# Patient Record
Sex: MALE | Race: BLACK OR AFRICAN AMERICAN | NOT HISPANIC OR LATINO
[De-identification: names, ages, dates, MRNs, and addresses within clinical notes are randomized per-mention and may not be internally consistent; named-entity substitution may affect disease eponyms.]

---

## 2022-03-22 PROBLEM — Z00.00 ENCOUNTER FOR PREVENTIVE HEALTH EXAMINATION: Status: ACTIVE | Noted: 2022-03-22

## 2022-05-05 ENCOUNTER — APPOINTMENT (OUTPATIENT)
Dept: ENDOCRINOLOGY | Facility: CLINIC | Age: 42
End: 2022-05-05

## 2022-07-07 ENCOUNTER — APPOINTMENT (OUTPATIENT)
Dept: ENDOCRINOLOGY | Facility: CLINIC | Age: 42
End: 2022-07-07

## 2022-09-21 ENCOUNTER — NON-APPOINTMENT (OUTPATIENT)
Age: 42
End: 2022-09-21

## 2022-09-22 ENCOUNTER — APPOINTMENT (OUTPATIENT)
Dept: UROLOGY | Facility: CLINIC | Age: 42
End: 2022-09-22

## 2022-10-06 ENCOUNTER — NON-APPOINTMENT (OUTPATIENT)
Age: 42
End: 2022-10-06

## 2022-10-07 ENCOUNTER — APPOINTMENT (OUTPATIENT)
Dept: UROLOGY | Facility: CLINIC | Age: 42
End: 2022-10-07

## 2022-11-03 ENCOUNTER — APPOINTMENT (OUTPATIENT)
Dept: ENDOCRINOLOGY | Facility: CLINIC | Age: 42
End: 2022-11-03

## 2022-11-15 ENCOUNTER — NON-APPOINTMENT (OUTPATIENT)
Age: 42
End: 2022-11-15

## 2022-11-18 ENCOUNTER — APPOINTMENT (OUTPATIENT)
Dept: UROLOGY | Facility: CLINIC | Age: 42
End: 2022-11-18

## 2022-12-06 ENCOUNTER — APPOINTMENT (OUTPATIENT)
Dept: ENDOCRINOLOGY | Facility: CLINIC | Age: 42
End: 2022-12-06

## 2022-12-06 VITALS
WEIGHT: 315 LBS | HEART RATE: 104 BPM | BODY MASS INDEX: 37.19 KG/M2 | HEIGHT: 77 IN | DIASTOLIC BLOOD PRESSURE: 126 MMHG | SYSTOLIC BLOOD PRESSURE: 193 MMHG

## 2022-12-06 DIAGNOSIS — I10 ESSENTIAL (PRIMARY) HYPERTENSION: ICD-10-CM

## 2022-12-06 DIAGNOSIS — E05.80 OTHER THYROTOXICOSIS W/OUT THYROTOXIC CRISIS OR STORM: ICD-10-CM

## 2022-12-06 DIAGNOSIS — E66.8 OTHER OBESITY: ICD-10-CM

## 2022-12-06 DIAGNOSIS — R73.03 PREDIABETES.: ICD-10-CM

## 2022-12-06 DIAGNOSIS — T46.2X5A OTHER THYROTOXICOSIS W/OUT THYROTOXIC CRISIS OR STORM: ICD-10-CM

## 2022-12-06 PROCEDURE — 76536 US EXAM OF HEAD AND NECK: CPT

## 2022-12-06 PROCEDURE — 99205 OFFICE O/P NEW HI 60 MIN: CPT | Mod: 25

## 2022-12-06 RX ORDER — ATENOLOL 25 MG/1
25 TABLET ORAL DAILY
Qty: 60 | Refills: 0 | Status: COMPLETED | COMMUNITY
Start: 2022-12-06 | End: 2022-12-06

## 2022-12-08 RX ORDER — METHIMAZOLE 10 MG/1
10 TABLET ORAL 3 TIMES DAILY
Qty: 270 | Refills: 3 | Status: ACTIVE | COMMUNITY
Start: 2022-06-14 | End: 1900-01-01

## 2022-12-09 RX ORDER — COLCHICINE 0.6 MG/1
TABLET ORAL
Refills: 0 | Status: ACTIVE | COMMUNITY

## 2022-12-09 NOTE — ASSESSMENT
[Long Term Vascular Complications] : long term vascular complications of diabetes [Carbohydrate Consistent Diet] : carbohydrate consistent diet [Importance of Diet and Exercise] : importance of diet and exercise to improve glycemic control, achieve weight loss and improve cardiovascular health [Weight Loss] : weight loss [Methimazole Therapy/PTU Therapy] : Risks and benefits of methimazole therapy/PTU therapy were discussed with the patient, including rash, liver dysfunction, and agranulocytosis. Patient was instructed to call the office for flu-like symptoms (e.g. 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 unclear if pt is presently on amiodarone. urgent Cards evaluation advised, he states he has an upcoming apt this week. Now appears hyperthyroid. WE discussed all options for reestablishing euthyroid state, including TT vs. ANTONY. vs. long term DIAZ use, and their respective r/b. After discussion, he is interested on definitive Rx w/ ANTONY. We will continue BB and increase DIAZ at this time to 30 mg, and perform NM uptake exam + treatment in 2-4 weeks. Verbalized understanding and agrees with treatment plan, will contact MD and seek emergency medical care if condition changes, we reviewed the sign/symptoms or worsening thyrotoxicosis and thyroid storm.\par \par 1) Obesity, Morbid: Class III, complicated by accelerated HTN. High risk of metabolic syndrome and future complications. Discussed options including meds, bariatric surgery and lifestyle modification. RB and alternatives discussed. Questions answered and he verbalized understanding. Refer to nutrition and start hypocaloric, hypocarb diet. further meds on hold pending restoration of euthyroid state\par \par HTN\par accelerated, c/b CHF per pt. r/o 2ry causes. low salt reviewed. urgent cards eval reinforced.\par \par

## 2022-12-09 NOTE — PROCEDURE
[FreeTextEntry1] : POC US of the thyroid gland 12/2022\par heterogenous isoechoic thyroid gland, thickened isthmus at 0.6 cm, Left Superior Thyroid Artery velocity at45 cm/s, moderately vascular thyroid parenchyma, no nodules.

## 2022-12-09 NOTE — HISTORY OF PRESENT ILLNESS
[FreeTextEntry1] : 41 y/o M w/ Hx of obesity, HTN, CHF w/ Afib and RVR, hyperthyroidism\par here for initial evaluation and management of several issues\par generally feels well and endorses no acute complaints.\par reports recent diagnosis of hyperthyroid state. reports two consecutive hosp at [facility] for palpitations. reports symptoms of excessive sweating, anxiety over the last 6 months. no clear trigger. Reports being briefly provided w./ amiodarone treatment, but denies present use. no past lithium/steroid exp. no family hx reported. no collateral info available for interpretation. recently, MTX 5 mg was started after latest hosp for rate control. reports adherence but is unable to describe meds, defers to consult form. he otherwise denies any f/c, CP, SOB, depressed mood, n/v, stool/urinary abn, skin/weight changes, heat/cold intolerance, HAs, breast/nipple changes, polyuria/polydipsia/nocturia or other complaints. he again denies any dysphagia, hoarseness, neck tenderness or new palpable masses. he again denies any family history of thyroid disorders or personal exposure to ionizing radiation.\par \par \par

## 2022-12-13 LAB
ALBUMIN SERPL ELPH-MCNC: 3.9 G/DL
ALDOSTERONE SERUM: 22.6 NG/DL
ALP BLD-CCNC: 83 U/L
ALT SERPL-CCNC: 35 U/L
ANION GAP SERPL CALC-SCNC: 13 MMOL/L
AST SERPL-CCNC: 29 U/L
BILIRUB SERPL-MCNC: 0.6 MG/DL
BUN SERPL-MCNC: 15 MG/DL
CALCIUM SERPL-MCNC: 9.8 MG/DL
CHLORIDE SERPL-SCNC: 104 MMOL/L
CO2 SERPL-SCNC: 27 MMOL/L
CREAT SERPL-MCNC: 1.45 MG/DL
EGFR: 61 ML/MIN/1.73M2
ESTIMATED AVERAGE GLUCOSE: 108 MG/DL
GLUCOSE SERPL-MCNC: 130 MG/DL
HBA1C MFR BLD HPLC: 5.4 %
POTASSIUM SERPL-SCNC: 4.3 MMOL/L
PROT SERPL-MCNC: 6.2 G/DL
RENIN ACTIVITY, PLASMA: 0.18 NG/ML/HR
SODIUM SERPL-SCNC: 143 MMOL/L
T3 SERPL-MCNC: 135 NG/DL
T4 FREE SERPL-MCNC: 2.6 NG/DL
THYROGLOB AB SERPL-ACNC: <20 IU/ML
THYROPEROXIDASE AB SERPL IA-ACNC: <10 IU/ML
TSH RECEPTOR AB: 32.4 IU/L
TSH SERPL-ACNC: <0.01 UIU/ML
TSI ACT/NOR SER: 37.5 IU/L

## 2023-01-03 ENCOUNTER — APPOINTMENT (OUTPATIENT)
Dept: UROLOGY | Facility: CLINIC | Age: 43
End: 2023-01-03

## 2023-07-13 ENCOUNTER — APPOINTMENT (OUTPATIENT)
Dept: ENDOCRINOLOGY | Facility: CLINIC | Age: 43
End: 2023-07-13

## 2023-09-07 ENCOUNTER — APPOINTMENT (OUTPATIENT)
Dept: PULMONOLOGY | Facility: CLINIC | Age: 43
End: 2023-09-07
Payer: COMMERCIAL

## 2023-09-07 VITALS
DIASTOLIC BLOOD PRESSURE: 90 MMHG | SYSTOLIC BLOOD PRESSURE: 120 MMHG | TEMPERATURE: 95 F | OXYGEN SATURATION: 98 % | BODY MASS INDEX: 37.19 KG/M2 | HEIGHT: 77 IN | WEIGHT: 315 LBS | HEART RATE: 98 BPM

## 2023-09-07 PROCEDURE — 99204 OFFICE O/P NEW MOD 45 MIN: CPT

## 2023-09-07 RX ORDER — ATORVASTATIN CALCIUM 80 MG/1
TABLET, FILM COATED ORAL
Refills: 0 | Status: ACTIVE | COMMUNITY

## 2023-09-07 RX ORDER — SPIRONOLACTONE 50 MG/1
TABLET ORAL
Refills: 0 | Status: ACTIVE | COMMUNITY

## 2023-09-07 RX ORDER — BUMETANIDE 2 MG/1
TABLET ORAL
Refills: 0 | Status: ACTIVE | COMMUNITY

## 2023-09-07 RX ORDER — METOPROLOL SUCCINATE 200 MG
200 TABLET, EXTENDED RELEASE 24 HR ORAL
Refills: 0 | Status: DISCONTINUED | COMMUNITY
End: 2023-09-07

## 2023-09-07 RX ORDER — CARVEDILOL 3.12 MG/1
TABLET, FILM COATED ORAL
Refills: 0 | Status: ACTIVE | COMMUNITY

## 2023-09-07 RX ORDER — ALLOPURINOL 200 MG/1
TABLET ORAL
Refills: 0 | Status: DISCONTINUED | COMMUNITY
End: 2023-09-07

## 2023-09-07 RX ORDER — DILTIAZEM HYDROCHLORIDE 240 MG/1
240 CAPSULE, EXTENDED RELEASE ORAL
Refills: 0 | Status: DISCONTINUED | COMMUNITY
End: 2023-09-07

## 2023-09-07 RX ORDER — APIXABAN 5 MG/1
TABLET, FILM COATED ORAL
Refills: 0 | Status: ACTIVE | COMMUNITY

## 2023-09-07 NOTE — CONSULT LETTER
[Dear  ___] : Dear  [unfilled], [( Thank you for referring [unfilled] for consultation for _____ )] : Thank you for referring [unfilled] for consultation for [unfilled] [Please see my note below.] : Please see my note below. [Consult Closing:] : Thank you very much for allowing me to participate in the care of this patient.  If you have any questions, please do not hesitate to contact me. [Sincerely,] : Sincerely, [FreeTextEntry2] : Nata Cardoso  [address] [place] [id number] [FreeTextEntry3] : [facility] Alva MD, [facility]

## 2023-09-07 NOTE — ASSESSMENT
[FreeTextEntry1] : Data reviewed:  PA/lat CXR [facility] 6/2023 personally reviewed : clear lungs, cardiomegaly   Impression: Cough, resolved  Plan: He apparently has heart failure and hyperthyroidism and just had a DVT on half dose Eliquis. He does not apparently have any lung disease. He had an acute cough in July that has resolved. I encouraged him to continue with care for his other problems but he can see me just as needed.

## 2023-09-07 NOTE — HISTORY OF PRESENT ILLNESS
[TextBox_4] : 09/07/2023: Asked to evaluate patient by Dr Cardoso/Nicolás Cueva for cough. This cough was in July and has largely resolved. He had a DVT in July. He has no history of lung disease and never smoked. He works for [employer], mostly outside work. Somewhat dyspneic in this heat but otherwise is ok. Carries a dx of heart failure, sees Dr Bazan. Has been on Eliquis x 2 years but reports that he was on a half dose when he was found to have the DVT. [ESS] : 10

## 2023-09-08 ENCOUNTER — APPOINTMENT (OUTPATIENT)
Dept: PULMONOLOGY | Facility: CLINIC | Age: 43
End: 2023-09-08

## 2023-10-06 ENCOUNTER — APPOINTMENT (OUTPATIENT)
Dept: ENDOCRINOLOGY | Facility: CLINIC | Age: 43
End: 2023-10-06

## 2024-01-11 ENCOUNTER — APPOINTMENT (OUTPATIENT)
Dept: ENDOCRINOLOGY | Facility: CLINIC | Age: 44
End: 2024-01-11

## 2024-04-25 ENCOUNTER — APPOINTMENT (OUTPATIENT)
Dept: OTOLARYNGOLOGY | Facility: CLINIC | Age: 44
End: 2024-04-25

## 2024-04-26 ENCOUNTER — APPOINTMENT (OUTPATIENT)
Dept: OTOLARYNGOLOGY | Facility: CLINIC | Age: 44
End: 2024-04-26

## 2024-07-25 ENCOUNTER — NON-APPOINTMENT (OUTPATIENT)
Age: 44
End: 2024-07-25

## 2024-08-01 ENCOUNTER — APPOINTMENT (OUTPATIENT)
Dept: ENDOCRINOLOGY | Facility: CLINIC | Age: 44
End: 2024-08-01
Payer: COMMERCIAL

## 2024-08-01 VITALS
WEIGHT: 315 LBS | BODY MASS INDEX: 57.99 KG/M2 | HEART RATE: 100 BPM | SYSTOLIC BLOOD PRESSURE: 171 MMHG | DIASTOLIC BLOOD PRESSURE: 94 MMHG

## 2024-08-01 DIAGNOSIS — E66.8 OTHER OBESITY: ICD-10-CM

## 2024-08-01 DIAGNOSIS — I10 ESSENTIAL (PRIMARY) HYPERTENSION: ICD-10-CM

## 2024-08-01 PROCEDURE — G2211 COMPLEX E/M VISIT ADD ON: CPT | Mod: NC

## 2024-08-01 PROCEDURE — 36415 COLL VENOUS BLD VENIPUNCTURE: CPT

## 2024-08-01 PROCEDURE — 99215 OFFICE O/P EST HI 40 MIN: CPT

## 2024-08-01 RX ORDER — TIRZEPATIDE 2.5 MG/.5ML
2.5 INJECTION, SOLUTION SUBCUTANEOUS
Qty: 8 | Refills: 0 | Status: ACTIVE | COMMUNITY
Start: 2024-08-01 | End: 1900-01-01

## 2024-08-01 NOTE — ASSESSMENT
[Long Term Vascular Complications] : long term vascular complications of diabetes [Carbohydrate Consistent Diet] : carbohydrate consistent diet [Importance of Diet and Exercise] : importance of diet and exercise to improve glycemic control, achieve weight loss and improve cardiovascular health [Weight Loss] : weight loss [Methimazole Therapy/PTU Therapy] : Risks and benefits of methimazole therapy/PTU therapy were discussed with the patient, including rash, liver dysfunction, and agranulocytosis. Patient was instructed to call the office for flu-like symptoms (e.g. 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  pt is not presently on amiodarone. urgent Cards evaluation advised, he states he has an upcoming apt this week. Now appears euthyroid. WE discussed all options for reestablishing euthyroid state, including TT vs. ANTONY. vs. long term DIAZ use, and their respective r/b. After discussion, he is interested on definitive Rx w/ ANTONY. We will continue BB and DIAZ at this time 5 mg, pending labs, f/u in 3 months Verbalized understanding and agrees with treatment plan, will contact MD and seek emergency medical care if condition changes, we reviewed the sign/symptoms or worsening thyrotoxicosis and thyroid storm.  1) Obesity, Morbid: Class III, complicated by accelerated HTN. High risk of metabolic syndrome and future complications. Discussed options including meds, bariatric surgery and lifestyle modification. RB and alternatives discussed. Questions answered and he verbalized understanding. Refer to nutrition and start hypocaloric, hypocarb diet. we will start zepbound at this time.  HTN accelerated, c/b CHF per pt. r/o 2ry causes. low salt reviewed. urgent cards eval reinforced.

## 2024-08-01 NOTE — HISTORY OF PRESENT ILLNESS
[FreeTextEntry1] : 43 y/o M w/ Hx of obesity, HTN, CHF w/ Afib and RVR, hyperthyroidism  initial evaluation and management of several issues generally feels well and endorses no acute complaints. reports recent diagnosis of hyperthyroid state. reports two consecutive hosp at [facility] for palpitations. reports symptoms of excessive sweating, anxiety over the last 6 months. no clear trigger. Reports being briefly provided w./ amiodarone treatment, but denies present use. no past lithium/steroid exp. no family hx reported. no collateral info available for interpretation. recently, MTX 5 mg was started after latest hosp for rate control. reports adherence but is unable to describe meds, defers to consult form.  7/2024 Here for /fu, generally feels well and endorses no acute complaints. No interval events since LV. Today comes to re establish care, lost to f/u x 2 years. notes no further exposure to amiodarone. does note progressive HF and CKD, now on torsemide, entresto, spironolactone and carvedilol. reports he is not on HMD at this time. reports MTX has been gradually reduced, now at 5 mg daily since last hosp for CHF exacerbation in 3/2024. reports compliance.  he otherwise denies any f/c, CP, SOB, depressed mood, n/v, stool/urinary abn, skin/weight changes, heat/cold intolerance, HAs, breast/nipple changes, polyuria/polydipsia/nocturia or other complaints. he again denies any dysphagia, hoarseness, neck tenderness or new palpable masses. he again denies any family history of thyroid disorders or personal exposure to ionizing radiation.

## 2024-08-02 ENCOUNTER — NON-APPOINTMENT (OUTPATIENT)
Age: 44
End: 2024-08-02

## 2024-08-02 DIAGNOSIS — E11.65 TYPE 2 DIABETES MELLITUS WITH HYPERGLYCEMIA: ICD-10-CM

## 2024-08-02 DIAGNOSIS — T46.2X5A OTHER THYROTOXICOSIS W/OUT THYROTOXIC CRISIS OR STORM: ICD-10-CM

## 2024-08-02 DIAGNOSIS — E05.80 OTHER THYROTOXICOSIS W/OUT THYROTOXIC CRISIS OR STORM: ICD-10-CM

## 2024-08-02 LAB
ALBUMIN SERPL ELPH-MCNC: 4.4 G/DL
ALP BLD-CCNC: 71 U/L
ALT SERPL-CCNC: 28 U/L
ANION GAP SERPL CALC-SCNC: 15 MMOL/L
AST SERPL-CCNC: 20 U/L
BILIRUB SERPL-MCNC: 0.4 MG/DL
BUN SERPL-MCNC: 12 MG/DL
CALCIUM SERPL-MCNC: 9.5 MG/DL
CHLORIDE SERPL-SCNC: 98 MMOL/L
CO2 SERPL-SCNC: 27 MMOL/L
CREAT SERPL-MCNC: 1.39 MG/DL
EGFR: 64 ML/MIN/1.73M2
ESTIMATED AVERAGE GLUCOSE: 148 MG/DL
GLUCOSE SERPL-MCNC: 115 MG/DL
HBA1C MFR BLD HPLC: 6.8 %
POTASSIUM SERPL-SCNC: 3.6 MMOL/L
PROT SERPL-MCNC: 7 G/DL
SODIUM SERPL-SCNC: 141 MMOL/L
T3 SERPL-MCNC: 64 NG/DL
T4 FREE SERPL-MCNC: 0.6 NG/DL
THYROGLOB AB SERPL-ACNC: <20 IU/ML
THYROPEROXIDASE AB SERPL IA-ACNC: 22.8 IU/ML
TSH RECEPTOR AB: 22 IU/L
TSH SERPL-ACNC: 18.5 UIU/ML

## 2024-08-02 RX ORDER — METHIMAZOLE 5 MG/1
5 TABLET ORAL
Qty: 60 | Refills: 1 | Status: ACTIVE | COMMUNITY
Start: 2024-08-02 | End: 1900-01-01

## 2024-08-02 RX ORDER — TIRZEPATIDE 2.5 MG/.5ML
2.5 INJECTION, SOLUTION SUBCUTANEOUS
Qty: 2 | Refills: 0 | Status: ACTIVE | COMMUNITY
Start: 2024-08-02 | End: 1900-01-01

## 2024-08-04 LAB — TSI ACT/NOR SER: 12.1 IU/L

## 2024-08-13 ENCOUNTER — NON-APPOINTMENT (OUTPATIENT)
Age: 44
End: 2024-08-13

## 2024-10-09 ENCOUNTER — APPOINTMENT (OUTPATIENT)
Dept: GASTROENTEROLOGY | Facility: CLINIC | Age: 44
End: 2024-10-09

## 2024-11-21 ENCOUNTER — APPOINTMENT (OUTPATIENT)
Dept: ENDOCRINOLOGY | Facility: CLINIC | Age: 44
End: 2024-11-21

## 2025-03-19 ENCOUNTER — NON-APPOINTMENT (OUTPATIENT)
Age: 45
End: 2025-03-19

## 2025-04-04 ENCOUNTER — APPOINTMENT (OUTPATIENT)
Dept: ENDOCRINOLOGY | Facility: CLINIC | Age: 45
End: 2025-04-04

## 2025-05-28 ENCOUNTER — APPOINTMENT (OUTPATIENT)
Dept: ENDOCRINOLOGY | Facility: CLINIC | Age: 45
End: 2025-05-28

## 2025-06-09 ENCOUNTER — APPOINTMENT (OUTPATIENT)
Dept: ENDOCRINOLOGY | Facility: CLINIC | Age: 45
End: 2025-06-09